# Patient Record
Sex: MALE | Race: WHITE | NOT HISPANIC OR LATINO
[De-identification: names, ages, dates, MRNs, and addresses within clinical notes are randomized per-mention and may not be internally consistent; named-entity substitution may affect disease eponyms.]

---

## 2023-06-19 ENCOUNTER — NON-APPOINTMENT (OUTPATIENT)
Age: 63
End: 2023-06-19

## 2023-06-19 ENCOUNTER — APPOINTMENT (OUTPATIENT)
Dept: CARDIOLOGY | Facility: CLINIC | Age: 63
End: 2023-06-19
Payer: COMMERCIAL

## 2023-06-19 VITALS
WEIGHT: 190 LBS | SYSTOLIC BLOOD PRESSURE: 118 MMHG | DIASTOLIC BLOOD PRESSURE: 80 MMHG | HEART RATE: 94 BPM | OXYGEN SATURATION: 95 %

## 2023-06-19 VITALS
OXYGEN SATURATION: 95 % | HEART RATE: 94 BPM | WEIGHT: 190 LBS | SYSTOLIC BLOOD PRESSURE: 118 MMHG | DIASTOLIC BLOOD PRESSURE: 80 MMHG

## 2023-06-19 DIAGNOSIS — Z87.01 PERSONAL HISTORY OF PNEUMONIA (RECURRENT): ICD-10-CM

## 2023-06-19 DIAGNOSIS — Z60.2 PROBLEMS RELATED TO LIVING ALONE: ICD-10-CM

## 2023-06-19 DIAGNOSIS — U07.1 COVID-19: ICD-10-CM

## 2023-06-19 DIAGNOSIS — Z82.62 FAMILY HISTORY OF OSTEOPOROSIS: ICD-10-CM

## 2023-06-19 DIAGNOSIS — F19.90 OTHER PSYCHOACTIVE SUBSTANCE USE, UNSPECIFIED, UNCOMPLICATED: ICD-10-CM

## 2023-06-19 DIAGNOSIS — Z82.49 FAMILY HISTORY OF ISCHEMIC HEART DISEASE AND OTHER DISEASES OF THE CIRCULATORY SYSTEM: ICD-10-CM

## 2023-06-19 DIAGNOSIS — Z87.898 PERSONAL HISTORY OF OTHER SPECIFIED CONDITIONS: ICD-10-CM

## 2023-06-19 PROBLEM — Z00.00 ENCOUNTER FOR PREVENTIVE HEALTH EXAMINATION: Status: ACTIVE | Noted: 2023-06-19

## 2023-06-19 PROCEDURE — 93000 ELECTROCARDIOGRAM COMPLETE: CPT

## 2023-06-19 PROCEDURE — 99204 OFFICE O/P NEW MOD 45 MIN: CPT

## 2023-06-19 RX ORDER — BUDESONIDE AND FORMOTEROL FUMARATE DIHYDRATE 160; 4.5 UG/1; UG/1
AEROSOL RESPIRATORY (INHALATION)
Refills: 0 | Status: ACTIVE | COMMUNITY

## 2023-06-19 SDOH — SOCIAL STABILITY - SOCIAL INSECURITY: PROBLEMS RELATED TO LIVING ALONE: Z60.2

## 2023-06-20 RX ORDER — TRAZODONE HYDROCHLORIDE 300 MG/1
TABLET ORAL
Refills: 0 | Status: ACTIVE | COMMUNITY

## 2023-06-20 NOTE — HISTORY OF PRESENT ILLNESS
[FreeTextEntry1] : 63-year-old man\par Cardiology consultation requested because of shortness of breath.\par \par Mr. Dang has no known heart disease.  There is no history of a myocardial infarction angina or congestive heart failure.  He was well until 5/23 where he began to experience shortness of breath in bed prior to sleeping . He described  positional  "air hunger."  His exercise endurance has diminished.  No chest pain.  No ankle edema.  No syncope\par He was evaluated by Dr. Spencer/internal medicine.  Laboratory studies and chest x-ray were reportedly unremarkable.  Those reports are not presently available.  He was advised to undergo a an echocardiogram and stress echocardiogram.\par \par Jeff has no history of smoking diabetes hypertension or hyperlipidemia.  His brother had a (" mild") myocardial infarction in his 40s.\par \par \par Mr. Jones presents today for cardiovascular evaluation.

## 2023-06-20 NOTE — DISCUSSION/SUMMARY
[FreeTextEntry1] : Shortness of breath/decreased stamina\par The working diagnosis is deconditioning.  The history is consistent with this diagnosis.  Of concern is the rather abrupt onset of the symptoms.  The differential diagnosis would include myocardial ischemia/atherosclerotic heart disease.  The absence of  chest pain argues against but does not eliminate this diagnosis.  The resting 6/23 electrocardiogram is abnormal, consistent with a a prior anterior wall myocardial infarction.  This finding raises the concern for left ventricular systolic dysfunction/cardiomyopathy.  However the history and physical findings do not support a diagnosis of acute pulmonary venous congestion/heart failure.  A noninvasive cardiac evaluation would be helpful for management decisions.\par \par I have recommended the following\par a.  Medical records not available at this time are requested for review including laboratory studies\par b.  Chest x-ray not available at this time is requested for review\par c.  Noninvasive cardiac evaluation to include\par     1.  Echocardiogram\par     2.  Stress echocardiogram\par d.  Further evaluation and treatment will be dictated by the results of the above evaluation and the patient's clinical course\par \par \par \par The diagnosis, prognosis, risks, options and alternatives were explained at length to the patient.  All questions were answered.  Issues discussed included shortness of breath, decreased stamina atherosclerotic heart disease abnormal electrocardiogram noninvasive cardiac testing diet and exercise.

## 2023-06-20 NOTE — PHYSICAL EXAM
[Normal Conjunctiva] : normal conjunctiva [Normal S1, S2] : normal S1, S2 [Clear Lung Fields] : clear lung fields [Soft] : abdomen soft [Non Tender] : non-tender [Normal Bowel Sounds] : normal bowel sounds [Normal Gait] : normal gait [No Rash] : no rash [No Focal Deficits] : no focal deficits [de-identified] : Appears in no distress lying flat [de-identified] : Normocephalic [de-identified] : No neck vein distention.  No carotid bruit [de-identified] : No murmur.  No gallop.  No diastolic sounds. [de-identified] : Full range of motion [de-identified] : No peripheral edema.  Dorsalis pedis pulses +2 bilaterally.  Feet warm and well-perfused.  No ulcerations. [de-identified] : Pleasant.

## 2023-06-25 ENCOUNTER — NON-APPOINTMENT (OUTPATIENT)
Age: 63
End: 2023-06-25

## 2023-07-06 ENCOUNTER — APPOINTMENT (OUTPATIENT)
Dept: PULMONOLOGY | Facility: CLINIC | Age: 63
End: 2023-07-06
Payer: COMMERCIAL

## 2023-07-06 VITALS
HEART RATE: 92 BPM | SYSTOLIC BLOOD PRESSURE: 120 MMHG | WEIGHT: 190 LBS | OXYGEN SATURATION: 95 % | DIASTOLIC BLOOD PRESSURE: 85 MMHG | BODY MASS INDEX: 26.6 KG/M2 | HEIGHT: 71 IN

## 2023-07-06 DIAGNOSIS — R06.02 SHORTNESS OF BREATH: ICD-10-CM

## 2023-07-06 PROCEDURE — 99204 OFFICE O/P NEW MOD 45 MIN: CPT

## 2023-07-06 NOTE — HISTORY OF PRESENT ILLNESS
[FreeTextEntry1] : Evaluation for shortness of breath. [de-identified] :   63-year-old male non-smoker.  He complains of shortness of breath with attempting to lie down prior to sleep since the end of May.  He also notes shortness of breath going up a flight of stairs at work.  There is no cough.  He rarely wheezes at nighttime.  He feels his exercise capacity has been reduced.  He saw cardiology and is scheduled for an echo next week and a stress test on July 25.  There are no prior history of lung problems.  He denies edema or PND.  He was started on Symbicort on May 23.  He is unclear if this helps him.  There is no history of asthma or allergies.  Current O2 sat 95 on room air.  Current FEV1 84% of predicted with an FVC of 77% of predicted.  He denies leg pain, hemoptysis.

## 2023-07-06 NOTE — ASSESSMENT
[FreeTextEntry1] :   The etiology of this patient's shortness of breath syndrome is unclear.  Patient will be referred for complete PFTs.  He must get me the report of his last chest x-ray which was done in June.  Patient is scheduled for an echocardiogram next week and I have asked him to call me after his study to let me know the result.  He is advised to stop Symbicort at this point as I have no clear evidence of obstructive lung disease.

## 2023-07-12 ENCOUNTER — APPOINTMENT (OUTPATIENT)
Dept: CARDIOLOGY | Facility: CLINIC | Age: 63
End: 2023-07-12
Payer: COMMERCIAL

## 2023-07-12 DIAGNOSIS — R94.31 ABNORMAL ELECTROCARDIOGRAM [ECG] [EKG]: ICD-10-CM

## 2023-07-12 PROCEDURE — 93306 TTE W/DOPPLER COMPLETE: CPT

## 2023-07-13 PROBLEM — R94.31 ABNORMAL EKG: Status: ACTIVE | Noted: 2023-06-20

## 2023-07-15 ENCOUNTER — RESULT REVIEW (OUTPATIENT)
Age: 63
End: 2023-07-15

## 2023-07-17 ENCOUNTER — NON-APPOINTMENT (OUTPATIENT)
Age: 63
End: 2023-07-17

## 2023-07-21 ENCOUNTER — APPOINTMENT (OUTPATIENT)
Dept: CARDIOLOGY | Facility: CLINIC | Age: 63
End: 2023-07-21
Payer: COMMERCIAL

## 2023-07-21 PROCEDURE — 93015 CV STRESS TEST SUPVJ I&R: CPT

## 2023-07-21 RX ORDER — SACUBITRIL AND VALSARTAN 24; 26 MG/1; MG/1
24-26 TABLET, FILM COATED ORAL
Qty: 60 | Refills: 3 | Status: ACTIVE | COMMUNITY
Start: 2023-07-21 | End: 1900-01-01

## 2023-07-25 ENCOUNTER — APPOINTMENT (OUTPATIENT)
Dept: CARDIOLOGY | Facility: CLINIC | Age: 63
End: 2023-07-25

## 2023-07-30 ENCOUNTER — HOSPITAL ENCOUNTER (OUTPATIENT)
Dept: HOSPITAL 74 - JER | Age: 63
Setting detail: OBSERVATION
LOS: 1 days | Discharge: HOME | End: 2023-07-31
Attending: INTERNAL MEDICINE
Payer: COMMERCIAL

## 2023-07-30 VITALS — BODY MASS INDEX: 26.2 KG/M2

## 2023-07-30 DIAGNOSIS — I50.40: ICD-10-CM

## 2023-07-30 DIAGNOSIS — I11.0: Primary | ICD-10-CM

## 2023-07-30 DIAGNOSIS — R00.2: ICD-10-CM

## 2023-07-30 DIAGNOSIS — Z88.0: ICD-10-CM

## 2023-07-30 LAB
ALBUMIN SERPL-MCNC: 3.8 G/DL (ref 3.4–5)
ALP SERPL-CCNC: 69 U/L (ref 45–117)
ALT SERPL-CCNC: 28 U/L (ref 13–61)
ANION GAP SERPL CALC-SCNC: 8 MMOL/L (ref 8–16)
AST SERPL-CCNC: 27 U/L (ref 15–37)
BASOPHILS # BLD: 0.8 % (ref 0–2)
BILIRUB SERPL-MCNC: 0.4 MG/DL (ref 0.2–1)
BNP SERPL-MCNC: 619.1 PG/ML (ref 5–125)
BUN SERPL-MCNC: 28.3 MG/DL (ref 7–18)
CALCIUM SERPL-MCNC: 9.1 MG/DL (ref 8.5–10.1)
CHLORIDE SERPL-SCNC: 108 MMOL/L (ref 98–107)
CO2 SERPL-SCNC: 27 MMOL/L (ref 21–32)
CREAT SERPL-MCNC: 1.1 MG/DL (ref 0.55–1.3)
DEPRECATED RDW RBC AUTO: 14 % (ref 11.9–15.9)
EOSINOPHIL # BLD: 0.8 % (ref 0–4.5)
GLUCOSE SERPL-MCNC: 90 MG/DL (ref 74–106)
HCT VFR BLD CALC: 48.4 % (ref 35.4–49)
HGB BLD-MCNC: 16.2 GM/DL (ref 11.7–16.9)
LYMPHOCYTES # BLD: 14.8 % (ref 8–40)
MAGNESIUM SERPL-MCNC: 2.2 MG/DL (ref 1.8–2.4)
MCH RBC QN AUTO: 31.6 PG (ref 25.7–33.7)
MCHC RBC AUTO-ENTMCNC: 33.5 G/DL (ref 32–35.9)
MCV RBC: 94.5 FL (ref 80–96)
MONOCYTES # BLD AUTO: 8 % (ref 3.8–10.2)
NEUTROPHILS # BLD: 75.6 % (ref 42.8–82.8)
PHOSPHATE SERPL-MCNC: 2.4 MG/DL (ref 2.5–4.9)
PLATELET # BLD AUTO: 178 10^3/UL (ref 134–434)
PMV BLD: 10.7 FL (ref 7.5–11.1)
POTASSIUM SERPLBLD-SCNC: 4.5 MMOL/L (ref 3.5–5.1)
PROT SERPL-MCNC: 6.7 G/DL (ref 6.4–8.2)
RBC # BLD AUTO: 5.13 M/MM3 (ref 4–5.6)
SODIUM SERPL-SCNC: 143 MMOL/L (ref 136–145)
WBC # BLD AUTO: 7 K/MM3 (ref 4–10)

## 2023-07-30 PROCEDURE — G0378 HOSPITAL OBSERVATION PER HR: HCPCS

## 2023-07-30 RX ADMIN — SACUBITRIL AND VALSARTAN SCH TAB: 24; 26 TABLET, FILM COATED ORAL at 21:16

## 2023-07-31 VITALS — SYSTOLIC BLOOD PRESSURE: 102 MMHG | HEART RATE: 68 BPM | TEMPERATURE: 98.2 F | DIASTOLIC BLOOD PRESSURE: 68 MMHG

## 2023-07-31 VITALS — RESPIRATION RATE: 18 BRPM

## 2023-07-31 LAB
ALBUMIN SERPL-MCNC: 3.7 G/DL (ref 3.4–5)
ALP SERPL-CCNC: 62 U/L (ref 45–117)
ALT SERPL-CCNC: 27 U/L (ref 13–61)
ANION GAP SERPL CALC-SCNC: 8 MMOL/L (ref 8–16)
AST SERPL-CCNC: 18 U/L (ref 15–37)
BASOPHILS # BLD: 0.5 % (ref 0–2)
BILIRUB SERPL-MCNC: 0.6 MG/DL (ref 0.2–1)
BUN SERPL-MCNC: 25.4 MG/DL (ref 7–18)
CALCIUM SERPL-MCNC: 8.6 MG/DL (ref 8.5–10.1)
CHLORIDE SERPL-SCNC: 107 MMOL/L (ref 98–107)
CHOLEST SERPL-MCNC: 160 MG/DL (ref 50–200)
CO2 SERPL-SCNC: 27 MMOL/L (ref 21–32)
CREAT SERPL-MCNC: 1.1 MG/DL (ref 0.55–1.3)
DEPRECATED RDW RBC AUTO: 13.9 % (ref 11.9–15.9)
EOSINOPHIL # BLD: 1.4 % (ref 0–4.5)
GLUCOSE SERPL-MCNC: 94 MG/DL (ref 74–106)
HCT VFR BLD CALC: 51.4 % (ref 35.4–49)
HDLC SERPL-MCNC: 47 MG/DL (ref 40–60)
HGB BLD-MCNC: 17.1 GM/DL (ref 11.7–16.9)
LDLC SERPL CALC-MCNC: 105 MG/DL (ref 5–100)
LYMPHOCYTES # BLD: 13.8 % (ref 8–40)
MCH RBC QN AUTO: 31.6 PG (ref 25.7–33.7)
MCHC RBC AUTO-ENTMCNC: 33.3 G/DL (ref 32–35.9)
MCV RBC: 94.8 FL (ref 80–96)
MONOCYTES # BLD AUTO: 8 % (ref 3.8–10.2)
NEUTROPHILS # BLD: 76.3 % (ref 42.8–82.8)
PLATELET # BLD AUTO: 196 10^3/UL (ref 134–434)
PMV BLD: 10.4 FL (ref 7.5–11.1)
POTASSIUM SERPLBLD-SCNC: 4.1 MMOL/L (ref 3.5–5.1)
PROT SERPL-MCNC: 6.6 G/DL (ref 6.4–8.2)
RBC # BLD AUTO: 5.42 M/MM3 (ref 4–5.6)
SODIUM SERPL-SCNC: 142 MMOL/L (ref 136–145)
WBC # BLD AUTO: 6.8 K/MM3 (ref 4–10)

## 2023-07-31 RX ADMIN — SACUBITRIL AND VALSARTAN SCH TAB: 24; 26 TABLET, FILM COATED ORAL at 09:21

## 2023-08-05 ENCOUNTER — TRANSCRIPTION ENCOUNTER (OUTPATIENT)
Age: 63
End: 2023-08-05

## 2023-08-07 ENCOUNTER — APPOINTMENT (OUTPATIENT)
Dept: CARDIOLOGY | Facility: CLINIC | Age: 63
End: 2023-08-07
Payer: COMMERCIAL

## 2023-08-07 PROCEDURE — 36415 COLL VENOUS BLD VENIPUNCTURE: CPT

## 2023-08-08 ENCOUNTER — RX CHANGE (OUTPATIENT)
Age: 63
End: 2023-08-08

## 2023-08-08 RX ORDER — VALSARTAN 40 MG/1
40 TABLET, COATED ORAL DAILY
Qty: 30 | Refills: 3 | Status: DISCONTINUED | COMMUNITY
Start: 2023-07-15 | End: 2023-08-08

## 2023-08-10 ENCOUNTER — RESULT REVIEW (OUTPATIENT)
Age: 63
End: 2023-08-10

## 2023-08-11 LAB
ANION GAP SERPL CALC-SCNC: 12 MMOL/L
BUN SERPL-MCNC: 33 MG/DL
CALCIUM SERPL-MCNC: 9.8 MG/DL
CHLORIDE SERPL-SCNC: 104 MMOL/L
CHOLEST SERPL-MCNC: 157 MG/DL
CO2 SERPL-SCNC: 24 MMOL/L
CREAT SERPL-MCNC: 1.06 MG/DL
EGFR: 78 ML/MIN/1.73M2
GLUCOSE SERPL-MCNC: 102 MG/DL
HDLC SERPL-MCNC: 48 MG/DL
LDLC SERPL CALC-MCNC: 89 MG/DL
NONHDLC SERPL-MCNC: 109 MG/DL
POTASSIUM SERPL-SCNC: 4.5 MMOL/L
SODIUM SERPL-SCNC: 140 MMOL/L
TRIGL SERPL-MCNC: 108 MG/DL

## 2023-08-14 ENCOUNTER — APPOINTMENT (OUTPATIENT)
Dept: CARDIOLOGY | Facility: CLINIC | Age: 63
End: 2023-08-14
Payer: COMMERCIAL

## 2023-08-14 VITALS
WEIGHT: 183 LBS | OXYGEN SATURATION: 97 % | SYSTOLIC BLOOD PRESSURE: 105 MMHG | BODY MASS INDEX: 25.62 KG/M2 | HEIGHT: 71 IN | HEART RATE: 76 BPM | DIASTOLIC BLOOD PRESSURE: 74 MMHG

## 2023-08-14 DIAGNOSIS — Z86.79 PERSONAL HISTORY OF OTHER DISEASES OF THE CIRCULATORY SYSTEM: ICD-10-CM

## 2023-08-14 DIAGNOSIS — I50.20 UNSPECIFIED SYSTOLIC (CONGESTIVE) HEART FAILURE: ICD-10-CM

## 2023-08-14 DIAGNOSIS — I38 ENDOCARDITIS, VALVE UNSPECIFIED: ICD-10-CM

## 2023-08-14 DIAGNOSIS — I51.9 HEART DISEASE, UNSPECIFIED: ICD-10-CM

## 2023-08-14 PROCEDURE — 93000 ELECTROCARDIOGRAM COMPLETE: CPT

## 2023-08-14 PROCEDURE — 99214 OFFICE O/P EST MOD 30 MIN: CPT

## 2023-08-14 RX ORDER — METOPROLOL SUCCINATE 25 MG/1
25 TABLET, EXTENDED RELEASE ORAL DAILY
Qty: 30 | Refills: 3 | Status: ACTIVE | COMMUNITY
Start: 2023-08-14 | End: 1900-01-01

## 2023-08-14 RX ORDER — TORSEMIDE 10 MG/1
10 TABLET ORAL
Qty: 30 | Refills: 3 | Status: ACTIVE | COMMUNITY
Start: 2023-08-14 | End: 1900-01-01

## 2023-08-14 NOTE — PHYSICAL EXAM
[Normal Conjunctiva] : normal conjunctiva [Normal S1, S2] : normal S1, S2 [Clear Lung Fields] : clear lung fields [Soft] : abdomen soft [Non Tender] : non-tender [Normal Bowel Sounds] : normal bowel sounds [Normal Gait] : normal gait [No Rash] : no rash [No Focal Deficits] : no focal deficits [de-identified] : Appears in no distress lying flat [de-identified] : Normocephalic [de-identified] : No neck vein distention.  No carotid bruit [de-identified] : No murmur.  No gallop.  No diastolic sounds. [de-identified] : Full range of motion [de-identified] : No peripheral edema.  Dorsalis pedis pulses +2 bilaterally.  Feet warm and well-perfused.  No ulcerations. [de-identified] : Pleasant.

## 2023-08-14 NOTE — HISTORY OF PRESENT ILLNESS
[FreeTextEntry1] :  63-year-old man Routine follow-up for cardiomyopathy/left ventricular systolic dysfunction  Trevon has had multiple emergency room evaluations [facility] and [place] over the last several weeks for complaints of dyspnea fatigue palpitations and abdominal bloating.  He presently feels "like I am dying."  No chest pain.  No ankle edema.  No syncope.   Cardiac MRI shows a left ventricular ejection fraction 23%

## 2023-08-14 NOTE — DISCUSSION/SUMMARY
[FreeTextEntry1] : Congestive heart failure  / Left ventricular systolic dysfunction  /cardiomyopathy The working diagnosis is heart failure with reduced ejection fraction secondary to a nonischemic cardiomyopathy.   Left ventricular systolic function is severely depressed.  Evaluation has included the following : A 7/23 echocardiogram left ventricle dilated end-diastolic dimension 6.6 cm global hypokinesis left ventricular ejection fraction 40% B  7/23 normal exercise treadmill ECG study C  8/23 cardiac MRI left ventricle dilated left ventricular ejection fraction 23%.  The absence of chest pain and normal exercise treadmill ECG study argues against but does not eliminate ischemic heart disease.  Coronary arteriography would be helpful for definitive diagnosis and to assess treatment alternatives with revascularization procedures.    I have recommended the following a. Metoprolol succinate 25 mg/day with further dose adjustment dictated by tolerance and response b. Torsemide 10 mg/day with monitoring of electrolytes and renal function c. Continue Entresto 24/26 mg twice daily d. Right and left heart catheterization   Valvular heart disease The working diagnosis is mitral regurgitation secondary to left ventricular dilatation The 7/23 echocardiogram demonstrated mild-moderate mitral regurgitation.  The end-diastolic dimension was 6.6 cm.  The 8/23 cardiac MRI showed mild mitral regurgitation.  The present cardiac physical examination is not suggestive of hemodynamically significant valvular pathology.  I have recommended the following: No further cardiac testing for this problem  The diagnosis, prognosis, risks, options and alternatives were explained at length to the patient.  All questions were answered.  Issues discussed included left ventricular systolic dysfunction cardiomyopathy atherosclerotic heart disease noninvasive cardiac testing and coronary arteriography with revascularization procedures.    Counseling and/or coordination of care Time was a significant factor for this patient encounter.  Total time spent with the patient was 30 minutes.  Greater than 50% of the time was devoted to counseling and/or coordination of care.

## 2023-08-17 ENCOUNTER — RESULT REVIEW (OUTPATIENT)
Age: 63
End: 2023-08-17

## 2023-08-20 DIAGNOSIS — I34.0 NONRHEUMATIC MITRAL (VALVE) INSUFFICIENCY: ICD-10-CM

## 2023-08-20 DIAGNOSIS — I51.9 HEART DISEASE, UNSPECIFIED: ICD-10-CM

## 2023-08-20 DIAGNOSIS — I42.9 CARDIOMYOPATHY, UNSPECIFIED: ICD-10-CM

## 2023-08-20 DIAGNOSIS — Z86.79 PERSONAL HISTORY OF OTHER DISEASES OF THE CIRCULATORY SYSTEM: ICD-10-CM

## 2024-02-12 ENCOUNTER — TRANSCRIPTION ENCOUNTER (OUTPATIENT)
Age: 64
End: 2024-02-12